# Patient Record
Sex: MALE | Race: WHITE | Employment: STUDENT | URBAN - METROPOLITAN AREA
[De-identification: names, ages, dates, MRNs, and addresses within clinical notes are randomized per-mention and may not be internally consistent; named-entity substitution may affect disease eponyms.]

---
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
## 2023-05-31 ENCOUNTER — OFFICE VISIT (OUTPATIENT)
Dept: PEDIATRICS CLINIC | Facility: CLINIC | Age: 23
End: 2023-05-31

## 2023-05-31 VITALS
BODY MASS INDEX: 28.44 KG/M2 | SYSTOLIC BLOOD PRESSURE: 128 MMHG | DIASTOLIC BLOOD PRESSURE: 77 MMHG | WEIGHT: 192 LBS | RESPIRATION RATE: 17 BRPM | HEART RATE: 56 BPM | HEIGHT: 69 IN

## 2023-05-31 DIAGNOSIS — Z00.129 ENCOUNTER FOR ROUTINE CHILD HEALTH EXAMINATION WITHOUT ABNORMAL FINDINGS: Primary | ICD-10-CM

## 2023-05-31 PROCEDURE — 99395 PREV VISIT EST AGE 18-39: CPT | Performed by: PEDIATRICS

## 2024-12-10 ENCOUNTER — OFFICE VISIT (OUTPATIENT)
Dept: PULMONOLOGY | Facility: CLINIC | Age: 24
End: 2024-12-10
Payer: COMMERCIAL

## 2024-12-10 VITALS
HEIGHT: 69 IN | DIASTOLIC BLOOD PRESSURE: 73 MMHG | BODY MASS INDEX: 28.14 KG/M2 | SYSTOLIC BLOOD PRESSURE: 123 MMHG | WEIGHT: 190 LBS | OXYGEN SATURATION: 99 % | HEART RATE: 53 BPM

## 2024-12-10 DIAGNOSIS — Z00.00 ROUTINE GENERAL MEDICAL EXAMINATION AT A HEALTH CARE FACILITY: Primary | ICD-10-CM

## 2024-12-10 PROCEDURE — 3078F DIAST BP <80 MM HG: CPT | Performed by: INTERNAL MEDICINE

## 2024-12-10 PROCEDURE — 3008F BODY MASS INDEX DOCD: CPT | Performed by: INTERNAL MEDICINE

## 2024-12-10 PROCEDURE — 3074F SYST BP LT 130 MM HG: CPT | Performed by: INTERNAL MEDICINE

## 2024-12-10 PROCEDURE — 99385 PREV VISIT NEW AGE 18-39: CPT | Performed by: INTERNAL MEDICINE

## 2024-12-10 NOTE — PROGRESS NOTES
Initial new patient primary care evaluation:    The patient is a 24-year-old male I am now evaluating for general health issues.    HISTORY OF PRESENT ILLNESS: The patient has no significant health history.  He is recently returned from college at the [employer].  His sleep and exercise and nutrition are all good.  Things are going well at work and with family and friends.  He declines flu shot today.  He has had COVID vaccination in the past.  He has never had a lipid panel.    PAST MEDICAL AND SURGICAL HISTORY:   1.  Concussion    SOCIAL HISTORY: Single, no children, no tobacco, occasional alcohol,  for design    FAMILY HISTORY: Mother and father alive and well, uncle  of colon cancer, grandmother  heart disease.    ALLERGIES TO MEDICATIONS: None    MEDICATIONS: None    REVIEW OF SYSTEMS: Review of Systems:  Vision normal. Ear nose and throat normal. Bowel normal. Bladder function normal. No depression. No thyroid disease. No lymphatic system concerns.  No rash. Muscles and joints unremarkable. No weight loss no weight gain.    PHYSICAL EXAMINATION: Physical Examination:  Vital signs normal. HEENT examination is unremarkable with pupils equal round and reactive to light and accommodation. Neck without adenopathy, thyromegaly, JVD nor bruit. Lungs clear to auscultation and percussion. Cardiac regular rate and rhythm no murmur. Abdomen nontender, without hepatosplenomegaly and no mass appreciable. Extremities and Musculoskeletal without clubbing cyanosis nor edema, and mobility acceptable. Neurologic grossly intact with symmetric tone and strength and reflex.    LABORATORY: None    ASSESSMENT AND PLAN:  PROBLEM 1.  General Health-excellent    RECOMMENDATIONS:  1.  The patient should never drink and drive, always wear a safety belt, have a smoke detector and fire extiguisher in the house, avoid the use of candles in the home as they are the most common cause of housefire, and see me  annually for complete examination.  2.  Colonoscopy at age 45  3.  Prostate cancer screening at age 50  4.  Annual flu shot-patient declines at present  5.  COVID vaccination  6.  Safe sex  7.  Heart healthy diet  8.  Patient to protect sleep time  9.  See me at the 1 year interval or sooner if needed and contact me promptly if new trouble.         Adonis Dior MD  Medical Director, Critical Care, [facility]  Medical Director, [facility]

## (undated) NOTE — LETTER
VACCINE ADMINISTRATION RECORD  PARENT / GUARDIAN APPROVAL  Date: 2020  Vaccine administered to: Roselyn Morton     : 2000    MRN: [id number]    A copy of the appropriate Centers for Disease Control and Prevention Vaccine Information statement h

## (undated) NOTE — LETTER
VACCINE ADMINISTRATION RECORD  PARENT / GUARDIAN APPROVAL  Date: 2020  Vaccine administered to: Deon Melendez     : 2000    MRN: [id number]    A copy of the appropriate Centers for Disease Control and Prevention Vaccine Information statement h

## (undated) NOTE — LETTER
Name:  Mason Laguerre Year:  11th Grade Class: Student ID No.:   Address:  [address] [place] [address] Phone:  [phone] (home) [phone] (work) :  14year old   Name Relationship [address] Work Phone Home Phone Mobile Phone 13. Does anyone in your family have a heart problem, pacemaker, or implanted defibrillator? 12. Has anyone in your family had unexplained fainting, seizures, or near drowning?      BONE AND JOINT QUESTIONS Yes No   17. Have you ever had an injury to a b after being hit or falling? 39.Have you ever been unable to move your arms / legs after being hit /fall? 40. Have you ever become ill while exercising in the heat?     41. Do you get frequent muscle cramps when exercising? 42.  Do you or someone · Murmurs (auscultation standing, supine, +/- Valsalva)  · Location of point of maximal impulse (PMI) Yes    Pulses Yes    Lungs Yes    Abdomen Yes    Genitourinary (males only)* Yes    Skin:  HSV, lesions suggestive of MRSA, tinea corporis Yes    Neurolog may be asked to submit to testing for the presence of performance-enhancing substances in my/his/her body either during [employer] state series events or during the school day, and I/our student do/does hereby agree to submit to such testing and analysis by a ce

## (undated) NOTE — LETTER
2020              Monica Waterman                                                                                : 2000        [address] [facility]         Immunization History   Administered Date(s) Administered   •

## (undated) NOTE — MR AVS SNAPSHOT
[place]  [address]  [phone]               Thank you for choosing us for your health care visit with Irina George MD.  We are glad to serve you and happy to provide you with this summa Sign Up Forms link in the Additional Information box on the right. Screen Tonic Questions? Call [phone] for help. Screen Tonic is NOT to be used for urgent needs. For medical emergencies, dial 911.             Educational Information     Healthy Acti o Preparing foods at home as a family  o Eating a diet rich in calcium  o Eating a high fiber diet    Help your children form healthy habits. Healthy active children are more likely to be healthy active adults!              Visit [facility]

## (undated) NOTE — LETTER
VACCINE ADMINISTRATION RECORD  PARENT / GUARDIAN APPROVAL  Date: 2017  Vaccine administered to: Marlena Steele     : 2000    MRN: [id number]    A copy of the appropriate Centers for Disease Control and Prevention Vaccine Information statement h

## (undated) NOTE — LETTER
Name:  Brian Morley Year:  10th Grade Class: Student ID No.:   Address:  [address] [place] [address] Phone:  [phone] (home) [phone] (work) :  15year old   Name Relationship [address] Work Phone Home Phone Mobile Phone 13. Does anyone in your family have a heart problem, pacemaker, or implanted defibrillator? 12. Has anyone in your family had unexplained fainting, seizures, or near drowning?      BONE AND JOINT QUESTIONS Yes No   17. Have you ever had an injury to a b after being hit or falling? 39.Have you ever been unable to move your arms / legs after being hit /fall? 40. Have you ever become ill while exercising in the heat?     41. Do you get frequent muscle cramps when exercising? 42.  Do you or someone · Murmurs (auscultation standing, supine, +/- Valsalva)  · Location of point of maximal impulse (PMI) Yes    Pulses Yes    Lungs Yes    Abdomen Yes    Genitourinary (males only)* Yes    Skin:  HSV, lesions suggestive of MRSA, tinea corporis Yes    Neurolog state series events or during the school day, and I/our student do/does hereby agree to submit to such testing and analysis by a certified laboratory.  We further understand and agree that the results of the performance-enhancing substance testing may be pr

## (undated) NOTE — LETTER
VACCINE ADMINISTRATION RECORD  PARENT / GUARDIAN APPROVAL  Date: 2019  Vaccine administered to: Macho Tsai     : 2000    MRN: [id number]    A copy of the appropriate Centers for Disease Control and Prevention Vaccine Information statement h

## (undated) NOTE — LETTER
[facility] [employer] of Child Health Examination       Student's Name  Kaylee Ricardo Birth Frankie DO         Date  8/17/2021   Signature                                                                                                                                              Title                           Date    (If adding dates to the above im (Food, drug, insect, other)  Patient has no known allergies. MEDICATION  (List all prescribed or taken on a regular basis.)     Diagnosis of asthma? Child wakes during the night coughing   Yes   No    Yes   No    Loss of function of one of paired organs? Ethnic Minority  No          Signs of Insulin Resistance (hypertension, dyslipidemia, polycystic ovarian syndrome, acanthosis nigricans)    No           At Risk  No   Lead Risk Questionnaire  Req'd for children 6 months thru 6 yrs enrolled in licensed or p Other   NEEDS/MODIFICATIONS required in the school setting  None DIETARY Needs/Restrictions     None   SPECIAL INSTRUCTIONS/DEVICES e.g. safety glasses, glass eye, chest protector for arrhythmia, pacemaker, prosthetic device, dental bridge, false teeth, at

## (undated) NOTE — LETTER
Date & Time: 8/13/2020, 8:40 AM  Patient: Justina Resendiz    Dear Justina Resendiz,    We are unable to reach you by phone and would like to follow up with you regarding your visit to the Emergency Department.         Please contact the Emergency Department ch

## (undated) NOTE — LETTER
[facility] [employer] of Child Health Examination       Student's Name  Lanie Tierney Birth Date Title                           Date     Signature HEALTH HISTORY          TO BE COMPLETED AND SIGNED BY PARENT/GUARDIAN AND VERIFIED BY HEALTH CARE PROVIDER    ALLERGIES  (Food, drug, insect, other)  Patient has no known allergies.  MEDICATION  (List all prescribed or taken on a regular basis.)  No current /47   Pulse 68   Ht 5' 8.25\" (1.734 m)   Wt 72.1 kg (159 lb)   BMI 24.00 kg/m²     DIABETES SCREENING  BMI>85% age/sex  No And any two of the following:  Family History No    Ethnic Minority  No          Signs of Insulin Resistance (hypertension, dy Currently Prescribed Asthma Medication:            Quick-relief  medication (e.g. Short Acting Beta Antagonist): No          Controller medication (e.g. inhaled corticosteroid):   No Other   NEEDS/MODIFICATIONS required in the school setting  None DIET

## (undated) NOTE — LETTER
[facility] [employer] Child Health Examination       Student's Name  Kandi Velázquez Birth Frankie DO                   Date  7/22/2019   Signature                                                                                                                                              Title                           Date    (If adding dates to the ALLERGIES  (Food, drug, insect, other)  Patient has no known allergies. MEDICATION  (List all prescribed or taken on a regular basis.)     Diagnosis of asthma?   Child wakes during the night coughing   Yes   No    Yes   No    Loss of function of one of pair Family History No    Ethnic Minority  No          Signs of Insulin Resistance (hypertension, dyslipidemia, polycystic ovarian syndrome, acanthosis nigricans)    No           At Risk  No   Lead Risk Questionnaire  Req'd for children 6 months thru 6 yrs viv[employer] Controller medication (e.g. inhaled corticosteroid):   No Other   NEEDS/MODIFICATIONS required in the school setting  None DIETARY Needs/Restrictions     None   SPECIAL INSTRUCTIONS/DEVICES e.g. safety glasses, glass eye, chest protector for arrhyt

## (undated) NOTE — LETTER
[facility] [employer] of Child Health Examination       Student's Name  Wil Albarran Birth Frankie Title                           Date  07/22/2019   Signature HEALTH HISTORY          TO BE COMPLETED AND SIGNED BY PARENT/GUARDIAN AND VERIFIED BY HEALTH CARE PROVIDER    ALLERGIES  (Food, drug, insect, other)  Patient has no known allergies.  MEDICATION  (List all prescribed or taken on a regular basis.)     Diagnos /62   Pulse (!) 46   Ht 5' 8\" (1.727 m)   Wt 74.4 kg (164 lb)   BMI 24.94 kg/m²     DIABETES SCREENING  BMI>85% age/sex  No And any two of the following:  Family History No    Ethnic Minority  No          Signs of Insulin Resistance (hypertension, d Currently Prescribed Asthma Medication:            Quick-relief  medication (e.g. Short Acting Beta Antagonist): No          Controller medication (e.g. inhaled corticosteroid):   No Other   NEEDS/MODIFICATIONS required in the school setting  None DIET